# Patient Record
Sex: FEMALE | Race: OTHER | NOT HISPANIC OR LATINO | URBAN - METROPOLITAN AREA
[De-identification: names, ages, dates, MRNs, and addresses within clinical notes are randomized per-mention and may not be internally consistent; named-entity substitution may affect disease eponyms.]

---

## 2017-11-29 ENCOUNTER — EMERGENCY (EMERGENCY)
Facility: HOSPITAL | Age: 27
LOS: 1 days | Discharge: HOME | End: 2017-11-29
Admitting: EMERGENCY MEDICINE
Payer: SELF-PAY

## 2017-11-29 VITALS
TEMPERATURE: 99 F | SYSTOLIC BLOOD PRESSURE: 133 MMHG | DIASTOLIC BLOOD PRESSURE: 82 MMHG | WEIGHT: 169.98 LBS | RESPIRATION RATE: 15 BRPM | HEART RATE: 96 BPM | OXYGEN SATURATION: 100 %

## 2017-11-29 DIAGNOSIS — B95.62 METHICILLIN RESISTANT STAPHYLOCOCCUS AUREUS INFECTION AS THE CAUSE OF DISEASES CLASSIFIED ELSEWHERE: ICD-10-CM

## 2017-11-29 DIAGNOSIS — L50.9 URTICARIA, UNSPECIFIED: ICD-10-CM

## 2017-11-29 DIAGNOSIS — R21 RASH AND OTHER NONSPECIFIC SKIN ERUPTION: ICD-10-CM

## 2017-11-29 PROCEDURE — 99053 MED SERV 10PM-8AM 24 HR FAC: CPT

## 2017-11-29 PROCEDURE — 99283 EMERGENCY DEPT VISIT LOW MDM: CPT | Mod: 25

## 2017-11-29 NOTE — ED ADULT NURSE NOTE - OBJECTIVE STATEMENT
Pt. c/o rash to bilateral arms x1 week. Pt. with a small abscess to the left forearm reports it was an old wound and recently began getting red and swollen.

## 2017-11-29 NOTE — ED ADULT TRIAGE NOTE - CHIEF COMPLAINT QUOTE
Pt reports  itchy rash to bilateral leg, arms and back over the past week. States she was treated for cellulitis to the right thigh a month ago and given antibiotics.

## 2017-11-30 VITALS
TEMPERATURE: 98 F | HEART RATE: 63 BPM | RESPIRATION RATE: 18 BRPM | DIASTOLIC BLOOD PRESSURE: 70 MMHG | OXYGEN SATURATION: 100 % | SYSTOLIC BLOOD PRESSURE: 105 MMHG

## 2017-11-30 RX ORDER — AZTREONAM 2 G
1 VIAL (EA) INJECTION
Qty: 20 | Refills: 0 | OUTPATIENT
Start: 2017-11-30 | End: 2017-12-10

## 2017-11-30 RX ORDER — DIPHENHYDRAMINE HCL 50 MG
25 CAPSULE ORAL ONCE
Qty: 0 | Refills: 0 | Status: COMPLETED | OUTPATIENT
Start: 2017-11-30 | End: 2017-11-30

## 2017-11-30 RX ADMIN — Medication 1 TABLET(S): at 00:54

## 2017-11-30 RX ADMIN — Medication 60 MILLIGRAM(S): at 00:54

## 2017-11-30 RX ADMIN — Medication 25 MILLIGRAM(S): at 00:54

## 2017-11-30 NOTE — ED PROVIDER NOTE - SKIN RASH DESCRIPTION
+ 3cm x 5cm area of erythema with induration to right lateral thigh, and 2cm x 4cm area of erythema with induration to left forearm. Nontender, no fluctuance, streaking or drainage from either site.

## 2017-11-30 NOTE — ED PROVIDER NOTE - OBJECTIVE STATEMENT
28 y/o F with no known significant PMH presents c/o worsening lesion to right thigh x 2-3 days. She states the lesion initially began a month and a half ago in which she was diagnosed with "skin infection" and prescribed clindamycin. She states the lesion "shrank and closed" however left residual scarring. Over the past 2-3 days she reports the lesion becoming larger and more noticeable, though denies any swelling or pain. She also reports new onset of a similar lesion to her left forearm over the past week. She also reports waking up with pruritic hives to her right elbow spreading proximally up her arm yesterday with unknown cause. States she has not taken any medications after finishing her ABX 1 month ago.    Denies fever, chills, dizziness, cough, dysphagia, drooling, SOB/wheezing, CP, abdo pain, N/V, recent travel

## 2017-11-30 NOTE — ED PROVIDER NOTE - MEDICAL DECISION MAKING DETAILS
Pt afebrile, nontoxic, sitting comfortably. Possible MRSA along with urticaria of unknown etiology. Will prescribe Bactrim DS and Prednisone with instructions to F/U with Dermatology in 24 hours. Benadryl PRN. Strict return precautions reviewed with pt in which pt verbalizes understanding and agrees to.

## 2017-11-30 NOTE — ED PROVIDER NOTE - CARE PLAN
Principal Discharge DX:	MRSA (methicillin resistant Staphylococcus aureus)  Secondary Diagnosis:	Urticaria

## 2020-05-01 NOTE — ED ADULT NURSE NOTE - HOW OFTEN DO YOU HAVE SIX OR MORE DRINKS ON ONE OCCASION?
----- Message from Ora Flor MA sent at 5/1/2020  9:30 AM CDT -----  Contact: patient  Type: Needs Medical Advice  Who Called:  Marleen Daugherty Call Back Number:   Additional Information: patient would like an earlier appointment on the same day of her appointment if it is available or comes available.  Please call to advise     Never

## 2020-06-08 PROBLEM — Z00.00 ENCOUNTER FOR PREVENTIVE HEALTH EXAMINATION: Status: ACTIVE | Noted: 2020-06-08

## 2020-06-10 ENCOUNTER — APPOINTMENT (OUTPATIENT)
Dept: CT IMAGING | Facility: CLINIC | Age: 30
End: 2020-06-10
Payer: MEDICAID

## 2020-06-10 ENCOUNTER — OUTPATIENT (OUTPATIENT)
Dept: OUTPATIENT SERVICES | Facility: HOSPITAL | Age: 30
LOS: 1 days | End: 2020-06-10

## 2020-06-10 PROCEDURE — 74177 CT ABD & PELVIS W/CONTRAST: CPT | Mod: 26

## 2020-11-06 ENCOUNTER — TRANSCRIPTION ENCOUNTER (OUTPATIENT)
Age: 30
End: 2020-11-06

## 2020-11-14 ENCOUNTER — APPOINTMENT (OUTPATIENT)
Dept: MRI IMAGING | Facility: CLINIC | Age: 30
End: 2020-11-14
Payer: MEDICAID

## 2020-11-14 ENCOUNTER — OUTPATIENT (OUTPATIENT)
Dept: OUTPATIENT SERVICES | Facility: HOSPITAL | Age: 30
LOS: 1 days | End: 2020-11-14

## 2020-11-14 PROCEDURE — 74183 MRI ABD W/O CNTR FLWD CNTR: CPT | Mod: 26
